# Patient Record
Sex: MALE | Race: WHITE | URBAN - METROPOLITAN AREA
[De-identification: names, ages, dates, MRNs, and addresses within clinical notes are randomized per-mention and may not be internally consistent; named-entity substitution may affect disease eponyms.]

---

## 2022-04-11 ENCOUNTER — HISTORICAL (OUTPATIENT)
Dept: ADMINISTRATIVE | Facility: HOSPITAL | Age: 32
End: 2022-04-11

## 2022-04-29 VITALS
BODY MASS INDEX: 31.48 KG/M2 | OXYGEN SATURATION: 97 % | DIASTOLIC BLOOD PRESSURE: 84 MMHG | SYSTOLIC BLOOD PRESSURE: 124 MMHG | HEIGHT: 72 IN | WEIGHT: 232.38 LBS

## 2024-06-13 ENCOUNTER — HOSPITAL ENCOUNTER (EMERGENCY)
Facility: HOSPITAL | Age: 34
Discharge: HOME | End: 2024-06-13
Attending: EMERGENCY MEDICINE
Payer: MEDICAID

## 2024-06-13 VITALS
BODY MASS INDEX: 27.44 KG/M2 | RESPIRATION RATE: 16 BRPM | HEART RATE: 69 BPM | OXYGEN SATURATION: 96 % | DIASTOLIC BLOOD PRESSURE: 63 MMHG | SYSTOLIC BLOOD PRESSURE: 105 MMHG | WEIGHT: 204.81 LBS | TEMPERATURE: 98 F

## 2024-06-13 DIAGNOSIS — M25.532 WRIST PAIN, ACUTE, LEFT: ICD-10-CM
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
## 2024-06-13 PROCEDURE — 25000003 PHARM REV CODE 250

## 2024-06-13 PROCEDURE — 90675 RABIES VACCINE IM: CPT | Mod: JZ,JG

## 2024-06-13 PROCEDURE — 63600175 PHARM REV CODE 636 W HCPCS: Mod: JZ,JG

## 2024-06-13 PROCEDURE — 90375 RABIES IG IM/SC: CPT | Mod: JZ,JG

## 2024-06-13 PROCEDURE — 90472 IMMUNIZATION ADMIN EACH ADD: CPT

## 2024-06-13 PROCEDURE — 12031 INTMD RPR S/A/T/EXT 2.5 CM/<: CPT

## 2024-06-13 PROCEDURE — 99284 EMERGENCY DEPT VISIT MOD MDM: CPT | Mod: 25

## 2024-06-13 PROCEDURE — 90471 IMMUNIZATION ADMIN: CPT

## 2024-06-13 PROCEDURE — 90715 TDAP VACCINE 7 YRS/> IM: CPT

## 2024-06-13 RX ORDER — LIDOCAINE HYDROCHLORIDE 10 MG/ML
10 INJECTION, SOLUTION EPIDURAL; INFILTRATION; INTRACAUDAL; PERINEURAL
Status: COMPLETED | OUTPATIENT
Start: 2024-06-13 | End: 2024-06-13

## 2024-06-13 RX ORDER — AMOXICILLIN AND CLAVULANATE POTASSIUM 875; 125 MG/1; MG/1
1 TABLET, FILM COATED ORAL 2 TIMES DAILY
Qty: 14 TABLET | Refills: 0 | Status: SHIPPED | OUTPATIENT
Start: 2024-06-13

## 2024-06-13 RX ORDER — HYDROCODONE BITARTRATE AND ACETAMINOPHEN 10; 325 MG/1; MG/1
1 TABLET ORAL EVERY 6 HOURS PRN
Qty: 12 TABLET | Refills: 0 | Status: SHIPPED | OUTPATIENT
Start: 2024-06-13

## 2024-06-13 RX ORDER — HYDROCODONE BITARTRATE AND ACETAMINOPHEN 10; 325 MG/1; MG/1
1 TABLET ORAL
Status: COMPLETED | OUTPATIENT
Start: 2024-06-13 | End: 2024-06-13

## 2024-06-13 RX ADMIN — BACITRACIN ZINC, NEOMYCIN, POLYMYXIN B: 400; 3.5; 5 OINTMENT TOPICAL at 10:06

## 2024-06-13 RX ADMIN — TETANUS TOXOID, REDUCED DIPHTHERIA TOXOID AND ACELLULAR PERTUSSIS VACCINE, ADSORBED 0.5 ML: 5; 2.5; 8; 8; 2.5 SUSPENSION INTRAMUSCULAR at 08:06

## 2024-06-13 RX ADMIN — RABIES VACCINE 2.5 UNITS: KIT at 08:06

## 2024-06-13 RX ADMIN — HYDROCODONE BITARTRATE AND ACETAMINOPHEN 1 TABLET: 10; 325 TABLET ORAL at 10:06

## 2024-06-13 RX ADMIN — LIDOCAINE HYDROCHLORIDE 100 MG: 10 SOLUTION INTRAVENOUS at 09:06

## 2024-06-13 RX ADMIN — RABIES IMMUNE GLOBULIN (HUMAN) 1860 UNITS: 300 INJECTION, SOLUTION INFILTRATION; INTRAMUSCULAR at 08:06

## 2024-06-13 NOTE — ED NOTES
Called [facility] to report dog bit. I was told that if the patient wants to make a report, for them to just call when they leave and a deputy will come out and take a report. Pt notified

## 2024-06-13 NOTE — ED PROVIDER NOTES
Encounter Date: 6/13/2024       History     Chief Complaint   Patient presents with    Animal Bite     Pt. Reports he was bit by a dog about an hour ago on the left hand. Pt does not know the dog that bit him.      33-year-old male presents to the emergency department after a dog bite to the left wrist and left hand about 1 hour prior to arrival.  Bleeding is controlled.  He denies knowing of the dog is for.  He denies 9 vaccination status of the dog.  He denies contacting animal control.  Unknown tetanus status.  He reports associated swelling, pain with range of motion, and pain to the left hand and wrist.  He denies numbness, tingling, or inability to move the affected extremity.  He is displaying full range of motion of the left wrist, left hand, and digits on the left side.  Denies any medical problems.  Denies any allergies to medications.           Review of patient's allergies indicates:  No Known Allergies  No past medical history on file.  No past surgical history on file.  No family history on file.     Review of Systems   Constitutional:  Negative for fever.   HENT:  Negative for sore throat.    Respiratory:  Negative for shortness of breath.    Cardiovascular:  Negative for chest pain.   Gastrointestinal:  Negative for nausea.   Genitourinary:  Negative for dysuria.   Musculoskeletal:  Negative for back pain and joint swelling.   Skin:  Positive for wound (animal bite). Negative for rash.   Neurological:  Negative for weakness and numbness.   Hematological:  Does not bruise/bleed easily.       Physical Exam     Initial Vitals [06/13/24 0811]   BP Pulse Resp Temp SpO2   105/63 69 18 97.7 °F (36.5 °C) 96 %      MAP       --         Physical Exam    Nursing note and vitals reviewed.  Constitutional: He appears well-developed and well-nourished.   HENT:   Head: Normocephalic and atraumatic.   Eyes: Conjunctivae and EOM are normal. Pupils are equal, round, and reactive to light.   Neck: Neck supple.   Normal  range of motion.  Cardiovascular:  Normal rate, regular rhythm, normal heart sounds and intact distal pulses.           Pulmonary/Chest: Breath sounds normal.   Abdominal: Abdomen is soft. Bowel sounds are normal. There is no abdominal tenderness. There is no rebound and no guarding.   Musculoskeletal:         General: Normal range of motion.      Left wrist: Laceration (2cm to dorsal wrist, puncture wound to volar wrist) and tenderness present. No deformity. Normal range of motion. Normal pulse.      Left hand: Swelling, laceration (puncture wound to dorsal lateral hand) and tenderness present. Normal range of motion. Normal strength. Normal capillary refill. Normal pulse.      Cervical back: Normal range of motion and neck supple.     Neurological: He is alert and oriented to person, place, and time. He has normal strength and normal reflexes. GCS eye subscore is 4. GCS verbal subscore is 5. GCS motor subscore is 6.   Skin: Skin is warm and dry. Capillary refill takes less than 2 seconds.   Psychiatric: He has a normal mood and affect. His behavior is normal. Thought content normal.         ED Course   Lac Repair    Date/Time: 6/13/2024 10:15 AM    Performed by: Jarvis Benitez NP  Authorized by: Renan Upton Jr., MD    Consent:     Consent obtained:  Verbal    Consent given by:  Patient    Risks discussed:  Infection and pain    Alternatives discussed:  No treatment  Universal protocol:     Patient identity confirmed:  Verbally with patient  Anesthesia:     Anesthesia method:  Local infiltration    Local anesthetic:  Lidocaine 1% w/o epi  Laceration details:     Location:  Shoulder/arm    Shoulder/arm location:  L lower arm    Length (cm):  2  Pre-procedure details:     Preparation:  Patient was prepped and draped in usual sterile fashion  Exploration:     Limited defect created (wound extended): yes      Imaging obtained: x-ray      Imaging outcome: foreign body not noted      Wound exploration: entire  depth of wound visualized      Contaminated: no    Treatment:     Area cleansed with:  Povidone-iodine and saline    Amount of cleaning:  Extensive    Irrigation solution:  Sterile saline    Irrigation method:  Pressure wash and syringe    Visualized foreign bodies/material removed: no    Skin repair:     Repair method:  Sutures    Suture size:  4-0    Suture material:  Nylon    Suture technique:  Simple interrupted    Number of sutures:  1  Approximation:     Approximation:  Loose (one suture for loose approximation, wound edges left open due to nature of wound)  Repair type:     Repair type:  Simple  Post-procedure details:     Dressing:  Sterile dressing and splint for protection    Procedure completion:  Tolerated well, no immediate complications    Labs Reviewed   HIV 1 / 2 ANTIBODY   HEPATITIS C ANTIBODY   HEP C VIRUS HOLD SPECIMEN          Imaging Results              X-Ray Wrist Complete Left (Final result)  Result time 06/13/24 08:49:07      Final result by Serge Montoya MD (06/13/24 08:49:07)                   Impression:      1.  Negative for acute process involving the visualized osseous structures.    2.  Air in the superficial and deep soft tissues of the distal forearm volarly without radiopaque foreign bodies consistent with the history given of a dog bite.      Electronically signed by: Serge Montoya MD  Date:    06/13/2024  Time:    08:49               Narrative:    EXAMINATION:  XR WRIST COMPLETE 3 VIEWS LEFT; XR HAND 2 VIEW LEFT    CLINICAL HISTORY:  Bitten by dog, initial encounter    TECHNIQUE:  PA, lateral, and oblique views of the left wrist and left hand were performed.  Seven total images were provided for review.    COMPARISON:  None    FINDINGS:  The carpal bones are well aligned.  The joint spaces are well maintained.  Negative for fracture or dislocation.    There are multiple air collections within the superficial and deep soft tissues of the distal forearm volarly.  Negative for  radiopaque foreign bodies.                                       X-Ray Hand 2 View Left (Final result)  Result time 06/13/24 08:49:07      Final result by Serge Montoya MD (06/13/24 08:49:07)                   Impression:      1.  Negative for acute process involving the visualized osseous structures.    2.  Air in the superficial and deep soft tissues of the distal forearm volarly without radiopaque foreign bodies consistent with the history given of a dog bite.      Electronically signed by: Serge Montoya MD  Date:    06/13/2024  Time:    08:49               Narrative:    EXAMINATION:  XR WRIST COMPLETE 3 VIEWS LEFT; XR HAND 2 VIEW LEFT    CLINICAL HISTORY:  Bitten by dog, initial encounter    TECHNIQUE:  PA, lateral, and oblique views of the left wrist and left hand were performed.  Seven total images were provided for review.    COMPARISON:  None    FINDINGS:  The carpal bones are well aligned.  The joint spaces are well maintained.  Negative for fracture or dislocation.    There are multiple air collections within the superficial and deep soft tissues of the distal forearm volarly.  Negative for radiopaque foreign bodies.                                       Medications   Tdap (BOOSTRIX) vaccine injection 0.5 mL (0.5 mLs Intramuscular Given 6/13/24 0840)   rabies vaccine, PCEC injection 2.5 Units (2.5 Units Intramuscular Given 6/13/24 0845)   rabies immune globulin (PF) (HYPERRAB) injection 1,860 Units (1,860 Units Other Given by Other 6/13/24 0845)   LIDOcaine (PF) 10 mg/ml (1%) injection 100 mg (100 mg Infiltration Given 6/13/24 0900)   HYDROcodone-acetaminophen  mg per tablet 1 tablet (1 tablet Oral Given 6/13/24 1009)   neomycin-bacitracin-polymyxin ointment ( Topical (Top) Given 6/13/24 1026)     Medical Decision Making  33-year-old male presents to the ED with a left wrist and hand animal bite.  Unknown vaccine status of the animal.  Patient was given rabies immunoglobulin to the 3 wounds of his  hand and wrist.  Rabies vaccine administered in ED. x-rays of affected extremity are normal.  Patient denies any numbness, tingling, decreased range of motion to the affected extremity.  Left hand and wrist are neurovascularly intact.  There is no clinical indication of tendinous or ligament injury to the left hand or wrist.  Pulses are intact to left wrist cap refill in left hand digits or less than 2 seconds. 1 suture applied to anterior wrist and 1 to volar wrist with loose proximity.  Wounds to left hand and left wrist irrigated extensively with sterile water and Betadine. One suture applied to left dorsal wrist for loose approximation of 2 cm laceration.  Suture is to be removed in 7-10 days.  Patient will be discharged home with Norco and Augmentin and follow up with PCP as needed. Patient will follow-up in ED for serial rabies vaccinations on June 16th, June 20th, and June 27th.  Patient will return to ED with any new swelling, erythema, drainage, or numbness and tingling to the affected extremity.     Amount and/or Complexity of Data Reviewed  Radiology: ordered.    Risk  OTC drugs.  Prescription drug management.                                      Clinical Impression:  Final diagnoses:  [W54.0XXA] Dog bite  [W54.0XXA] Dog bite, initial encounter (Primary)  [M25.532] Wrist pain, acute, left          ED Disposition Condition    Discharge Stable          ED Prescriptions       Medication Sig Dispense Start Date End Date Auth. Provider    HYDROcodone-acetaminophen (NORCO)  mg per tablet Take 1 tablet by mouth every 6 (six) hours as needed for Pain. 12 tablet 6/13/2024 -- Jarvis Benitez NP    amoxicillin-clavulanate 875-125mg (AUGMENTIN) 875-125 mg per tablet Take 1 tablet by mouth 2 (two) times daily. 14 tablet 6/13/2024 -- Jarvis Benitez NP          Follow-up Information       Follow up With Specialties Details Why Contact Info    [facility]. Emergency Medicine In 3 days For second  es vaccine [address] [place] [id number] [phone]             Jarvis Benitez, NP  06/13/24 1108